# Patient Record
Sex: FEMALE | Race: WHITE
[De-identification: names, ages, dates, MRNs, and addresses within clinical notes are randomized per-mention and may not be internally consistent; named-entity substitution may affect disease eponyms.]

---

## 2017-10-29 ENCOUNTER — HOSPITAL ENCOUNTER (EMERGENCY)
Dept: HOSPITAL 53 - M ED | Age: 22
Discharge: HOME | End: 2017-10-29
Payer: OTHER GOVERNMENT

## 2017-10-29 VITALS — DIASTOLIC BLOOD PRESSURE: 68 MMHG | SYSTOLIC BLOOD PRESSURE: 124 MMHG

## 2017-10-29 VITALS — BODY MASS INDEX: 22.13 KG/M2 | HEIGHT: 62 IN | WEIGHT: 120.26 LBS

## 2017-10-29 DIAGNOSIS — K21.0: Primary | ICD-10-CM

## 2017-10-29 LAB
ANION GAP SERPL CALC-SCNC: 7 MEQ/L (ref 8–16)
BASOPHILS # BLD AUTO: 0.1 10^3/UL (ref 0–0.2)
BASOPHILS NFR BLD AUTO: 0.8 % (ref 0–1)
BUN SERPL-MCNC: 10 MG/DL (ref 7–18)
CALCIUM SERPL-MCNC: 8.6 MG/DL (ref 8.5–10.1)
CHLORIDE SERPL-SCNC: 107 MEQ/L (ref 98–107)
CO2 SERPL-SCNC: 26 MEQ/L (ref 21–32)
CONTROL LINE HCG: (no result)
CREAT SERPL-MCNC: 0.66 MG/DL (ref 0.55–1.02)
EOSINOPHIL # BLD AUTO: 0.1 10^3/UL (ref 0–0.5)
EOSINOPHIL NFR BLD AUTO: 1.1 % (ref 0–3)
ERYTHROCYTE [DISTWIDTH] IN BLOOD BY AUTOMATED COUNT: 12.2 % (ref 11.5–14.5)
GFR SERPL CREATININE-BSD FRML MDRD: > 60 ML/MIN/{1.73_M2} (ref 60–?)
GLUCOSE SERPL-MCNC: 90 MG/DL (ref 70–105)
IMM GRANULOCYTES NFR BLD: 0.5 % (ref 0–0)
INR PPP: 1.11
LYMPHOCYTES # BLD AUTO: 2.4 10^3/UL (ref 1.5–6.5)
LYMPHOCYTES NFR BLD AUTO: 27.5 % (ref 24–44)
MCH RBC QN AUTO: 30.6 PG (ref 27–33)
MCHC RBC AUTO-ENTMCNC: 33.6 G/DL (ref 32–36.5)
MCV RBC AUTO: 91.1 FL (ref 80–96)
MONOCYTES # BLD AUTO: 0.8 10^3/UL (ref 0–0.8)
MONOCYTES NFR BLD AUTO: 8.9 % (ref 0–5)
NEUTROPHILS # BLD AUTO: 5.4 10^3/UL (ref 1.8–7.7)
NEUTROPHILS NFR BLD AUTO: 61.2 % (ref 36–66)
NRBC BLD AUTO-RTO: 0 % (ref 0–0)
PLATELET # BLD AUTO: 261 10^3/UL (ref 150–450)
POTASSIUM SERPL-SCNC: 3.7 MEQ/L (ref 3.5–5.1)
SODIUM SERPL-SCNC: 140 MEQ/L (ref 136–145)
WBC # BLD AUTO: 8.9 10^3/UL (ref 4–10)

## 2017-10-29 NOTE — ECGEPIP
Stationary ECG Study

                           [facility] - ED

                                       

                                       Test Date:    2017-10-29

Pat Name:     GUERLINE UNDERWOOD           Department:   

Patient ID:   [id number]                 Room:         -

Gender:       F                        Technician:   jared

:          1995               Requested By: SHAR WEI

Order Number: [id number]     Reading MD:   Maja Lundborg-Gray

                                 Measurements

Intervals                              Axis          

Rate:         75                       P:            -8

MO:           104                      QRS:          97

QRSD:         104                      T:            37

QT:           395                                    

QTc:          444                                    

                           Interpretive Statements

SINUS RHYTHM WITH SINUS ARRHYTHMIA WITH SHORT MO INTERVAL

BORDERLINE RIGHT AXIS DEVIATION

INCOMPLETE RIGHT BUNDLE BRANCH BLOCK

NO OLD ECG CHANGES 

Electronically Signed On 10- 18:24:24 EDT by Maja Lundborg-Gray

## 2017-10-29 NOTE — REP
Chest x-ray:  Two views.

 

History: Chest pain .

 

Comparison study: No comparison .

 

Findings:  The lungs are well inflated and free of infiltrate.  The pleural

angles are sharp.  The heart size is normal.  Pulmonary vasculature is not

increased.  No significant bony abnormality is seen. EKG monitoring electrodes

overlie the chest.

 

Impression:

 

Negative chest x-ray.

 

 

Signed by

Jac Lemus MD 10/29/2017 03:55 P

## 2018-05-22 ENCOUNTER — HOSPITAL ENCOUNTER (OUTPATIENT)
Dept: HOSPITAL 53 - M SFHCLERA | Age: 23
End: 2018-05-22
Attending: PHYSICIAN ASSISTANT
Payer: COMMERCIAL

## 2018-05-22 DIAGNOSIS — N89.8: ICD-10-CM

## 2018-05-22 DIAGNOSIS — R10.30: Primary | ICD-10-CM

## 2018-05-22 LAB
CHLAMYDIA DNA AMPLIFICATION: NEGATIVE
GC DNA AMPLIFICATION: NEGATIVE

## 2018-05-22 PROCEDURE — 87086 URINE CULTURE/COLONY COUNT: CPT

## 2018-11-20 ENCOUNTER — HOSPITAL ENCOUNTER (EMERGENCY)
Dept: HOSPITAL 53 - M ED | Age: 23
Discharge: HOME | End: 2018-11-20
Payer: COMMERCIAL

## 2018-11-20 DIAGNOSIS — R10.2: ICD-10-CM

## 2018-11-20 DIAGNOSIS — Z3A.01: ICD-10-CM

## 2018-11-20 DIAGNOSIS — M54.5: ICD-10-CM

## 2018-11-20 DIAGNOSIS — O26.891: Primary | ICD-10-CM

## 2018-11-20 LAB
ANION GAP: 7 MEQ/L (ref 8–16)
APPEARANCE, URINE: CLEAR
BACTERIA UR QL AUTO: NEGATIVE
BASO #: 0.1 10^3/UL (ref 0–0.2)
BASO %: 0.8 % (ref 0–1)
BILIRUBIN, URINE AUTO: NEGATIVE
BLOOD UREA NITROGEN: 10 MG/DL (ref 7–18)
BLOOD, URINE BLOOD: NEGATIVE
CALCIUM LEVEL: 8.8 MG/DL (ref 8.5–10.1)
CARBON DIOXIDE LEVEL: 27 MEQ/L (ref 21–32)
CHLORIDE LEVEL: 105 MEQ/L (ref 98–107)
CREATININE FOR GFR: 0.59 MG/DL (ref 0.55–1.3)
EOS #: 0.2 10^3/UL (ref 0–0.5)
EOSINOPHIL NFR BLD AUTO: 2 % (ref 0–3)
GFR SERPL CREATININE-BSD FRML MDRD: > 60 ML/MIN/{1.73_M2} (ref 60–?)
GLUCOSE, FASTING: 95 MG/DL (ref 70–100)
GLUCOSE, URINE (UA) AUTO: NEGATIVE MG/DL
HCG, SERUM QUANTITATIVE: (no result) MIU/ML
HEMATOCRIT: 40 % (ref 36–47)
HEMOGLOBIN: 13.3 G/DL (ref 12–15.5)
IMMATURE GRANULOCYTE %: 0.3 % (ref 0–3)
KETONE, URINE AUTO: NEGATIVE MG/DL
LEUKOCYTE ESTERASE UR QL STRIP.AUTO: NEGATIVE
LYMPH #: 2.3 10^3/UL (ref 1.5–6.5)
LYMPH %: 29.6 % (ref 24–44)
MEAN CORPUSCULAR HEMOGLOBIN: 30.9 PG (ref 27–33)
MEAN CORPUSCULAR HGB CONC: 33.3 G/DL (ref 32–36.5)
MEAN CORPUSCULAR VOLUME: 92.8 FL (ref 80–96)
MONO #: 0.7 10^3/UL (ref 0–0.8)
MONO %: 9.1 % (ref 0–5)
NEUTROPHILS #: 4.5 10^3/UL (ref 1.8–7.7)
NEUTROPHILS %: 58.2 % (ref 36–66)
NITRITE, URINE AUTO: NEGATIVE
NRBC BLD AUTO-RTO: 0 % (ref 0–0)
PH,URINE: 6 UNITS (ref 5–9)
PLATELET COUNT, AUTOMATED: 226 10^3/UL (ref 150–450)
POTASSIUM SERUM: 3.7 MEQ/L (ref 3.5–5.1)
PROT UR QL STRIP.AUTO: NEGATIVE MG/DL
RBC, URINE AUTO: 1 /HPF (ref 0–3)
RED BLOOD COUNT: 4.31 10^6/UL (ref 4–5.4)
RED CELL DISTRIBUTION WIDTH: 12.2 % (ref 11.5–14.5)
SODIUM LEVEL: 139 MEQ/L (ref 136–145)
SPECIFIC GRAVITY URINE AUTO: 1 (ref 1–1.03)
SQUAMOUS #/AREA URNS AUTO: 2 /HPF (ref 0–6)
UROBILINOGEN, URINE AUTO: 0.2 MG/DL (ref 0–2)
WBC, URINE AUTO: 0 /HPF (ref 0–3)
WHITE BLOOD COUNT: 7.7 10^3/UL (ref 4–10)

## 2018-11-20 PROCEDURE — 76801 OB US < 14 WKS SINGLE FETUS: CPT

## 2018-11-20 RX ADMIN — SODIUM CHLORIDE 1 MLS/HR: 9 INJECTION, SOLUTION INTRAVENOUS at 18:15

## 2019-06-22 ENCOUNTER — HOSPITAL ENCOUNTER (OUTPATIENT)
Dept: HOSPITAL 53 - M LDO | Age: 24
Discharge: HOME | End: 2019-06-22
Attending: OBSTETRICS & GYNECOLOGY
Payer: SELF-PAY

## 2019-06-22 VITALS — SYSTOLIC BLOOD PRESSURE: 124 MMHG | DIASTOLIC BLOOD PRESSURE: 68 MMHG

## 2019-06-22 VITALS — SYSTOLIC BLOOD PRESSURE: 128 MMHG | DIASTOLIC BLOOD PRESSURE: 82 MMHG

## 2019-06-22 VITALS — BODY MASS INDEX: 27.3 KG/M2 | HEIGHT: 62 IN | WEIGHT: 148.37 LBS

## 2019-06-22 VITALS — SYSTOLIC BLOOD PRESSURE: 126 MMHG | DIASTOLIC BLOOD PRESSURE: 82 MMHG

## 2019-06-22 DIAGNOSIS — Z3A.37: ICD-10-CM

## 2019-06-22 DIAGNOSIS — R03.0: ICD-10-CM

## 2019-06-22 DIAGNOSIS — O26.893: Primary | ICD-10-CM

## 2019-06-22 PROCEDURE — 59025 FETAL NON-STRESS TEST: CPT

## 2019-07-12 ENCOUNTER — HOSPITAL ENCOUNTER (INPATIENT)
Dept: HOSPITAL 53 - M LDO | Age: 24
LOS: 3 days | Discharge: HOME | DRG: 773 | End: 2019-07-15
Attending: OBSTETRICS & GYNECOLOGY | Admitting: OBSTETRICS & GYNECOLOGY
Payer: SELF-PAY

## 2019-07-12 ENCOUNTER — HOSPITAL ENCOUNTER (OUTPATIENT)
Dept: HOSPITAL 53 - M LDO | Age: 24
Discharge: HOME | End: 2019-07-12
Attending: MIDWIFE
Payer: SELF-PAY

## 2019-07-12 VITALS — WEIGHT: 165.35 LBS | BODY MASS INDEX: 30.43 KG/M2 | HEIGHT: 62 IN

## 2019-07-12 VITALS — SYSTOLIC BLOOD PRESSURE: 138 MMHG | DIASTOLIC BLOOD PRESSURE: 91 MMHG

## 2019-07-12 VITALS — SYSTOLIC BLOOD PRESSURE: 119 MMHG | DIASTOLIC BLOOD PRESSURE: 71 MMHG

## 2019-07-12 VITALS — DIASTOLIC BLOOD PRESSURE: 81 MMHG | SYSTOLIC BLOOD PRESSURE: 134 MMHG

## 2019-07-12 VITALS — SYSTOLIC BLOOD PRESSURE: 132 MMHG | DIASTOLIC BLOOD PRESSURE: 79 MMHG

## 2019-07-12 VITALS — DIASTOLIC BLOOD PRESSURE: 64 MMHG | SYSTOLIC BLOOD PRESSURE: 117 MMHG

## 2019-07-12 VITALS — SYSTOLIC BLOOD PRESSURE: 128 MMHG | DIASTOLIC BLOOD PRESSURE: 88 MMHG

## 2019-07-12 VITALS — DIASTOLIC BLOOD PRESSURE: 59 MMHG | SYSTOLIC BLOOD PRESSURE: 113 MMHG

## 2019-07-12 VITALS — SYSTOLIC BLOOD PRESSURE: 111 MMHG | DIASTOLIC BLOOD PRESSURE: 63 MMHG

## 2019-07-12 VITALS — DIASTOLIC BLOOD PRESSURE: 83 MMHG | SYSTOLIC BLOOD PRESSURE: 129 MMHG

## 2019-07-12 VITALS — SYSTOLIC BLOOD PRESSURE: 119 MMHG | DIASTOLIC BLOOD PRESSURE: 68 MMHG

## 2019-07-12 VITALS — DIASTOLIC BLOOD PRESSURE: 76 MMHG | SYSTOLIC BLOOD PRESSURE: 122 MMHG

## 2019-07-12 VITALS — SYSTOLIC BLOOD PRESSURE: 118 MMHG | DIASTOLIC BLOOD PRESSURE: 63 MMHG

## 2019-07-12 VITALS — SYSTOLIC BLOOD PRESSURE: 134 MMHG | DIASTOLIC BLOOD PRESSURE: 78 MMHG

## 2019-07-12 VITALS — SYSTOLIC BLOOD PRESSURE: 102 MMHG | DIASTOLIC BLOOD PRESSURE: 50 MMHG

## 2019-07-12 VITALS — SYSTOLIC BLOOD PRESSURE: 137 MMHG | DIASTOLIC BLOOD PRESSURE: 82 MMHG

## 2019-07-12 VITALS — DIASTOLIC BLOOD PRESSURE: 82 MMHG | SYSTOLIC BLOOD PRESSURE: 144 MMHG

## 2019-07-12 VITALS — DIASTOLIC BLOOD PRESSURE: 60 MMHG | SYSTOLIC BLOOD PRESSURE: 118 MMHG

## 2019-07-12 VITALS — SYSTOLIC BLOOD PRESSURE: 112 MMHG | DIASTOLIC BLOOD PRESSURE: 67 MMHG

## 2019-07-12 VITALS — DIASTOLIC BLOOD PRESSURE: 53 MMHG | SYSTOLIC BLOOD PRESSURE: 108 MMHG

## 2019-07-12 VITALS — DIASTOLIC BLOOD PRESSURE: 72 MMHG | SYSTOLIC BLOOD PRESSURE: 118 MMHG

## 2019-07-12 VITALS — SYSTOLIC BLOOD PRESSURE: 131 MMHG | DIASTOLIC BLOOD PRESSURE: 75 MMHG

## 2019-07-12 VITALS — DIASTOLIC BLOOD PRESSURE: 71 MMHG | SYSTOLIC BLOOD PRESSURE: 113 MMHG

## 2019-07-12 VITALS — SYSTOLIC BLOOD PRESSURE: 114 MMHG | DIASTOLIC BLOOD PRESSURE: 59 MMHG

## 2019-07-12 VITALS — SYSTOLIC BLOOD PRESSURE: 141 MMHG | DIASTOLIC BLOOD PRESSURE: 84 MMHG

## 2019-07-12 VITALS — SYSTOLIC BLOOD PRESSURE: 130 MMHG | DIASTOLIC BLOOD PRESSURE: 79 MMHG

## 2019-07-12 VITALS — SYSTOLIC BLOOD PRESSURE: 109 MMHG | DIASTOLIC BLOOD PRESSURE: 65 MMHG

## 2019-07-12 VITALS — WEIGHT: 152.78 LBS | HEIGHT: 62 IN | BODY MASS INDEX: 28.11 KG/M2

## 2019-07-12 VITALS — SYSTOLIC BLOOD PRESSURE: 117 MMHG | DIASTOLIC BLOOD PRESSURE: 78 MMHG

## 2019-07-12 VITALS — DIASTOLIC BLOOD PRESSURE: 56 MMHG | SYSTOLIC BLOOD PRESSURE: 114 MMHG

## 2019-07-12 VITALS — DIASTOLIC BLOOD PRESSURE: 56 MMHG | SYSTOLIC BLOOD PRESSURE: 111 MMHG

## 2019-07-12 DIAGNOSIS — O26.853: Primary | ICD-10-CM

## 2019-07-12 DIAGNOSIS — Z3A.40: ICD-10-CM

## 2019-07-12 DIAGNOSIS — O47.1: ICD-10-CM

## 2019-07-12 LAB
ALT SERPL W P-5'-P-CCNC: 20 U/L (ref 12–78)
BILIRUB SERPL-MCNC: 0.3 MG/DL (ref 0.2–1)
CREAT SERPL-MCNC: 0.72 MG/DL (ref 0.55–1.3)
GFR SERPL CREATININE-BSD FRML MDRD: > 60 ML/MIN/{1.73_M2} (ref 60–?)
HCT VFR BLD AUTO: 39.6 % (ref 36–47)
HGB BLD-MCNC: 13.7 G/DL (ref 12–15.5)
LDH SERPL L TO P-CCNC: 185 U/L (ref 84–246)
MCH RBC QN AUTO: 31.4 PG (ref 27–33)
MCHC RBC AUTO-ENTMCNC: 34.6 G/DL (ref 32–36.5)
MCV RBC AUTO: 90.8 FL (ref 80–96)
PLATELET # BLD AUTO: 184 10^3/UL (ref 150–450)
RBC # BLD AUTO: 4.36 10^6/UL (ref 4–5.4)
URATE SERPL-MCNC: 4.4 MG/DL (ref 2.6–6)
WBC # BLD AUTO: 10.3 10^3/UL (ref 4–10)

## 2019-07-12 PROCEDURE — 59025 FETAL NON-STRESS TEST: CPT

## 2019-07-12 PROCEDURE — 10907ZC DRAINAGE OF AMNIOTIC FLUID, THERAPEUTIC FROM PRODUCTS OF CONCEPTION, VIA NATURAL OR ARTIFICIAL OPENING: ICD-10-PCS | Performed by: OBSTETRICS & GYNECOLOGY

## 2019-07-12 RX ADMIN — SODIUM CHLORIDE, POTASSIUM CHLORIDE, SODIUM LACTATE AND CALCIUM CHLORIDE SCH MLS/HR: 600; 310; 30; 20 INJECTION, SOLUTION INTRAVENOUS at 18:31

## 2019-07-13 VITALS — DIASTOLIC BLOOD PRESSURE: 65 MMHG | SYSTOLIC BLOOD PRESSURE: 118 MMHG

## 2019-07-13 VITALS — DIASTOLIC BLOOD PRESSURE: 62 MMHG | SYSTOLIC BLOOD PRESSURE: 115 MMHG

## 2019-07-13 VITALS — DIASTOLIC BLOOD PRESSURE: 78 MMHG | SYSTOLIC BLOOD PRESSURE: 132 MMHG

## 2019-07-13 VITALS — DIASTOLIC BLOOD PRESSURE: 68 MMHG | SYSTOLIC BLOOD PRESSURE: 104 MMHG

## 2019-07-13 VITALS — DIASTOLIC BLOOD PRESSURE: 66 MMHG | SYSTOLIC BLOOD PRESSURE: 129 MMHG

## 2019-07-13 VITALS — SYSTOLIC BLOOD PRESSURE: 123 MMHG | DIASTOLIC BLOOD PRESSURE: 73 MMHG

## 2019-07-13 VITALS — SYSTOLIC BLOOD PRESSURE: 120 MMHG | DIASTOLIC BLOOD PRESSURE: 79 MMHG

## 2019-07-13 VITALS — DIASTOLIC BLOOD PRESSURE: 83 MMHG | SYSTOLIC BLOOD PRESSURE: 138 MMHG

## 2019-07-13 VITALS — SYSTOLIC BLOOD PRESSURE: 130 MMHG | DIASTOLIC BLOOD PRESSURE: 69 MMHG

## 2019-07-13 VITALS — SYSTOLIC BLOOD PRESSURE: 133 MMHG | DIASTOLIC BLOOD PRESSURE: 81 MMHG

## 2019-07-13 VITALS — DIASTOLIC BLOOD PRESSURE: 81 MMHG | SYSTOLIC BLOOD PRESSURE: 132 MMHG

## 2019-07-13 VITALS — DIASTOLIC BLOOD PRESSURE: 58 MMHG | SYSTOLIC BLOOD PRESSURE: 114 MMHG

## 2019-07-13 VITALS — SYSTOLIC BLOOD PRESSURE: 126 MMHG | DIASTOLIC BLOOD PRESSURE: 73 MMHG

## 2019-07-13 VITALS — DIASTOLIC BLOOD PRESSURE: 66 MMHG | SYSTOLIC BLOOD PRESSURE: 127 MMHG

## 2019-07-13 VITALS — SYSTOLIC BLOOD PRESSURE: 122 MMHG | DIASTOLIC BLOOD PRESSURE: 74 MMHG

## 2019-07-13 VITALS — SYSTOLIC BLOOD PRESSURE: 125 MMHG | DIASTOLIC BLOOD PRESSURE: 73 MMHG

## 2019-07-13 VITALS — DIASTOLIC BLOOD PRESSURE: 67 MMHG | SYSTOLIC BLOOD PRESSURE: 130 MMHG

## 2019-07-13 VITALS — DIASTOLIC BLOOD PRESSURE: 56 MMHG | SYSTOLIC BLOOD PRESSURE: 114 MMHG

## 2019-07-13 VITALS — SYSTOLIC BLOOD PRESSURE: 120 MMHG | DIASTOLIC BLOOD PRESSURE: 65 MMHG

## 2019-07-13 VITALS — SYSTOLIC BLOOD PRESSURE: 121 MMHG | DIASTOLIC BLOOD PRESSURE: 65 MMHG

## 2019-07-13 VITALS — SYSTOLIC BLOOD PRESSURE: 133 MMHG | DIASTOLIC BLOOD PRESSURE: 89 MMHG

## 2019-07-13 VITALS — SYSTOLIC BLOOD PRESSURE: 114 MMHG | DIASTOLIC BLOOD PRESSURE: 60 MMHG

## 2019-07-13 VITALS — DIASTOLIC BLOOD PRESSURE: 76 MMHG | SYSTOLIC BLOOD PRESSURE: 126 MMHG

## 2019-07-13 VITALS — DIASTOLIC BLOOD PRESSURE: 67 MMHG | SYSTOLIC BLOOD PRESSURE: 122 MMHG

## 2019-07-13 VITALS — SYSTOLIC BLOOD PRESSURE: 132 MMHG | DIASTOLIC BLOOD PRESSURE: 81 MMHG

## 2019-07-13 VITALS — SYSTOLIC BLOOD PRESSURE: 114 MMHG | DIASTOLIC BLOOD PRESSURE: 64 MMHG

## 2019-07-13 VITALS — DIASTOLIC BLOOD PRESSURE: 66 MMHG | SYSTOLIC BLOOD PRESSURE: 119 MMHG

## 2019-07-13 LAB
BASE EXCESS BLDCOV CALC-SCNC: -2 MMOL/L
CO2 BLDCOV CALC-SCNC: 24 MEQ/L
HCO3 STD BLDCOV-SCNC: 22.5 MEQ/L
HCO3 STD BLDCOV-SCNC: 22.8 MEQ/L
PCO2 BLDCOV: 39.3 MMHG
PH BLDCOV: 7.38 UNITS
PO2 BLDCOV: 40.6 MMHG
SAO2 % BLDCOV: 82.8 %

## 2019-07-13 RX ADMIN — SODIUM CHLORIDE, POTASSIUM CHLORIDE, SODIUM LACTATE AND CALCIUM CHLORIDE SCH MLS/HR: 600; 310; 30; 20 INJECTION, SOLUTION INTRAVENOUS at 01:21

## 2019-07-13 RX ADMIN — Medication SCH TAB: at 10:51

## 2019-07-13 RX ADMIN — IBUPROFEN SCH MG: 800 TABLET, FILM COATED ORAL at 23:55

## 2019-07-13 RX ADMIN — IBUPROFEN SCH MG: 800 TABLET, FILM COATED ORAL at 15:47

## 2019-07-13 NOTE — HPE
DATE OF ADMISSION:  2019

 

Riana is a 24-year-old female  1, para 0 with an estimated date of

confinement (EDC) of 2019, estimated gestational age (EGA) 40 weeks

gestation, who presented to labor and delivery after being evaluated earlier for

early labor.  She presented with increased contractions, unable to tolerate the

contractions.  Upon evaluation she was found to be in early labor.  At this point

a decision was made to admit the patient.  Her prenatal records were reviewed.

 

PRENATAL LABS:

Blood type is O positive.  Rubella immune.  Hepatitis negative.  HIV negative.

GC and  chlamydia negative.  1-hour sugar testing was within normal limits.  Her

Group B Streptococcus (GBS) is negative.

 

PAST MEDICAL HISTORY:  Denies.

 

PAST SURGICAL HISTORY:  Denies.

 

SOCIAL HISTORY:  Denies any alcohol, drug or cigarette smoking.

 

REVIEW OF SYSTEMS:  Unremarkable.

 

MEDICATIONS

- prenatal vitamin

 

ALLERGIES:  No known drug allergies.

 

PHYSICAL EXAMINATION

HEENT:  Grossly within normal limits.

ABDOMEN:  Soft, nontender, nondistended.

EXTREMITIES:  No clubbing, cyanosis or edema.

VAGINAL EXAM:  3 cm dilated, 80% effaced, fetus at -2 station and vertex

position.  Tracing reviewed, category 1 tracing, contractions every 3-4 minutes.

 

ASSESSMENT:

Intrauterine pregnancy at 40 weeks gestation in early labor.

GBS negative.

 

PLAN:

Admit to labor and delivery.  Routine labs sent.  Pain management discussed.

Patient opts for an epidural.  Will continue to monitor.  Anticipate delivery.

## 2019-07-13 NOTE — IPN
DATE:  2019

 

This patient requested circumcision of her  male infant.  After discussing

the risks and benefits of the circumcision, the medical and nonmedical

indications, penile block and aftercare, expressed understanding of penile block

aftercare and bleeding, signed the consent form.  We await clearance by the

pediatrician.  20-minute discussion regarding aftercare.  All questions were

answered.

## 2019-07-14 VITALS — DIASTOLIC BLOOD PRESSURE: 68 MMHG | SYSTOLIC BLOOD PRESSURE: 118 MMHG

## 2019-07-14 VITALS — DIASTOLIC BLOOD PRESSURE: 63 MMHG | SYSTOLIC BLOOD PRESSURE: 119 MMHG

## 2019-07-14 VITALS — SYSTOLIC BLOOD PRESSURE: 121 MMHG | DIASTOLIC BLOOD PRESSURE: 76 MMHG

## 2019-07-14 VITALS — DIASTOLIC BLOOD PRESSURE: 61 MMHG | SYSTOLIC BLOOD PRESSURE: 104 MMHG

## 2019-07-14 VITALS — SYSTOLIC BLOOD PRESSURE: 129 MMHG | DIASTOLIC BLOOD PRESSURE: 76 MMHG

## 2019-07-14 VITALS — DIASTOLIC BLOOD PRESSURE: 57 MMHG | SYSTOLIC BLOOD PRESSURE: 116 MMHG

## 2019-07-14 LAB
HCT VFR BLD AUTO: 27.1 % (ref 36–47)
HGB BLD-MCNC: 9 G/DL (ref 12–15.5)
MCH RBC QN AUTO: 31.7 PG (ref 27–33)
MCHC RBC AUTO-ENTMCNC: 33.2 G/DL (ref 32–36.5)
MCV RBC AUTO: 95.4 FL (ref 80–96)
PLATELET # BLD AUTO: 128 10^3/UL (ref 150–450)
RBC # BLD AUTO: 2.84 10^6/UL (ref 4–5.4)
WBC # BLD AUTO: 13 10^3/UL (ref 4–10)

## 2019-07-14 RX ADMIN — IBUPROFEN SCH MG: 800 TABLET, FILM COATED ORAL at 16:28

## 2019-07-14 RX ADMIN — IBUPROFEN SCH MG: 800 TABLET, FILM COATED ORAL at 08:17

## 2019-07-14 RX ADMIN — IBUPROFEN SCH MG: 800 TABLET, FILM COATED ORAL at 23:30

## 2019-07-14 RX ADMIN — Medication SCH TAB: at 08:17

## 2019-07-14 RX ADMIN — DOCUSATE SODIUM SCH MG: 100 CAPSULE, LIQUID FILLED ORAL at 21:07

## 2019-07-14 NOTE — IPN
DATE:  2019

 

A 24-year-old  one, admitted at 40 weeks of gestation with contractions,

had a primary  section for arrest of dilatation, live birth male infant

weighing 7 pounds 5 ounces, Apgar scores of 9 and 10 at one and five minutes

respectfully. Venous pH 7.3, base excess -2.0.

 

On examination today, her blood pressure is 104/61, respirations 16, pulse 72,

temperature 97.8.  We discussed phlebitis, cystitis, mastitis, endometritis and

cellulitis, diet, exercise, pain management, perineal, breast and wound care,

The rest of the examination unremarkable.

Normocephalic, atraumatic.  Neck:  Full range of motion.  Pupils equal and

reactive to light.  Distal pulses symmetric.  No evidence of deep venous

thrombosis (DVT), pulmonary embolism (PE), or superficial phlebitis.

Chest is clear bilaterally bases.  No wheezes or rhonchi.

Abdomen:  Soft, uterus two below.  Lochia is moderate.  Incision is clean and

dry.  Four quadrant bowel sounds are noted.  Perineum is intact and she has

moderate lochia.  No rashes, lesions or pruritus.

No arthralgia, myalgia.  No complaint of joint pain.  No complaint of cough,

wheeze, shortness of breath or dyspnea on exertion.  No nausea, vomiting,

diarrhea or constipation.

 

In summary, we have a term gestation, delivered a live birth male infant.  Plans

for discharge tomorrow.  Medication will be dispensed and narcotics are at

[facility] to be dispensed on Monday morning.

## 2019-07-14 NOTE — RO
DATE OF PROCEDURE:  2019

 

Riana is a 24-year-old female who was admitted at 40 weeks gestation in labor.

She progressed to approximately 9 cm and had a face presentation with an arrest

of dilatation.  At this point, the patient was counseled extensively.  She had an

arrest of dilatation for over 3 hours.  The decision was made to proceed with a

primary  section.

 

PREOPERATIVE DIAGNOSES:

1.  Term pregnancy.

2.  Arrest of dilatation.

3.  Face presentation.

 

POSTOPERATIVE DIAGNOSES:

1.  Term pregnancy.

2.  Arrest of dilatation.

3.  Face presentation.

 

PROCEDURE:  Primary low transverse  section via Pfannenstiel incision.

 

SURGEON;  Alejandro Ramos DO

 

ASSISTANT:

 

ANESTHESIA:  Epidural.

 

COMPLICATIONS:  None.

 

ESTIMATED BLOOD LOSS:  500 mL.

 

FINDINGS:  Live male infant in occiput transverse position.  Apgar scores 9 and

10.  Birth weight 7 pounds 5 ounces.  Normal appearing tubes and ovaries.

 

DESCRIPTION OF PROCEDURE:  After obtaining informed consent, the patient was

taken to the operating room where epidural anesthetic was found to be adequate.

She was then draped and prepped in the usual sterile fashion in the supine

position.

 

At this point, a Pfannenstiel incision was made.  This was carried down to the

fascia.  Fascia was incised in midline fashion and carried through laterally.

Superior aspect of the fascia was then grasped with two Kocher clamps, tented off

and dissected off the rectus muscles sharply.  The inferior aspect was dissected

off in similar fashion.  Rectus muscles  in midline fashion.  Peritoneum

identified.  Peritoneal cavity entered bluntly.  Superior and inferior dissection

of the peritoneum was then done with good visualization of the bladder.  At this

point, a Mobius skin retractor was placed.  A low transverse uterine incision was

made.  Infant was delivered in atraumatic fashion.  Nose and mouth bulb

suctioned.  Cord doubly clamped and cut, and the infant was handed over to

awaiting warmer.  Cord blood and cord gas were sent.  Placenta removed manually.

Uterus cleared of all clot and debris, and the uterine incision was then repaired

in two separate layers of #0 Vicryl sutures.  Pelvis copiously irrigated with

normal saline and suctioned out.

 

Attention was turned to the peritoneum which was closed in a running fashion

using #2-0 Vicryl.  All superficial bleeders coagulated.  Fascia closed in two

separate segments of #0 Vicryl suture, and the skin was reapproximated in a

subcuticular fashion using #3-0 Vicryl on a Kal[place].  Steri-Strips placed.  The

patient tolerated procedure well.  She was then transferred to recovery room in

stable condition.

## 2019-07-15 VITALS — DIASTOLIC BLOOD PRESSURE: 65 MMHG | SYSTOLIC BLOOD PRESSURE: 132 MMHG

## 2019-07-15 VITALS — SYSTOLIC BLOOD PRESSURE: 117 MMHG | DIASTOLIC BLOOD PRESSURE: 69 MMHG

## 2019-07-15 VITALS — DIASTOLIC BLOOD PRESSURE: 43 MMHG | SYSTOLIC BLOOD PRESSURE: 81 MMHG

## 2019-07-15 VITALS — DIASTOLIC BLOOD PRESSURE: 71 MMHG | SYSTOLIC BLOOD PRESSURE: 125 MMHG

## 2019-07-15 RX ADMIN — IBUPROFEN SCH MG: 800 TABLET, FILM COATED ORAL at 09:40

## 2019-07-15 RX ADMIN — IBUPROFEN SCH MG: 800 TABLET, FILM COATED ORAL at 16:52

## 2019-07-15 RX ADMIN — Medication SCH TAB: at 09:40

## 2019-07-15 RX ADMIN — DOCUSATE SODIUM SCH MG: 100 CAPSULE, LIQUID FILLED ORAL at 09:40

## 2019-07-15 NOTE — DSES
DATE OF ADMISSION:  2019

DATE OF DISCHARGE:  07/15/2019

 

This lady is 24-year-old  1 now para 1 admitted at 40 weeks with

contractions, had a primary  section for arrest of dilatation, male

infant 7 pounds 5 ounces, Apgar scores of nine and ten at 1 and 5 minutes

respectfully.  Venous pH 7.38, base excess -2.0.

 

On her second postpartum day we discussed phlebitis, cystitis, mastitis,

endometritis, cellulitis, diet, exercise pain management, perineal, breast and

wound care.

 

On discharge her blood pressure was 81/43, respirations are 16, pulse 71,

temperature is 98.2.  Her admitting hemoglobin was 13.7, hematocrit 39.6 and

platelets 184.  Discharge hemoglobin 9.0, hematocrit 27.1 and platelets were 
128.

The rest examination unremarkable.  Normocephalic, atraumatic.  Neck full range

of motion.  Pupils equal and reactive to light.  Distal pulses symmetric.  No

evidence of deep vein thrombosis (DVT), pulmonary embolism (PE), or superficial

phlebitis.  Chest is clear bilaterally to the bases.  No wheeze or rhonchi.  No

CVA tenderness.  Abdomen is soft.  Uterus 2 below.  Lochia is moderate.  
Incision

is clean and dry.  Four quadrant bowel sounds are noted.  No rashes, lesions or

pruritus.  No arthralgias, myalgias.  No complaint of joint pain.  No cough,

wheeze, shortness of breath or dyspnea on exertion.  No nausea, vomiting,

diarrhea or constipation.  No urgency or frequency.

 

In summary, we have a term gestation delivered by primary  section live

birth male infant.

 

Plans are two week incision check at [place], six week postpartum check at 
[place].  Medications were dispensed to the patient and the narcotics are at 
[facility]

for the  to  after 0800 hours today.  All questions were answered.



edited: 2019 0720 kira MOREIRA